# Patient Record
Sex: FEMALE | Race: WHITE
[De-identification: names, ages, dates, MRNs, and addresses within clinical notes are randomized per-mention and may not be internally consistent; named-entity substitution may affect disease eponyms.]

---

## 2019-04-02 ENCOUNTER — HOSPITAL ENCOUNTER (EMERGENCY)
Dept: HOSPITAL 26 - MED | Age: 29
Discharge: HOME | End: 2019-04-02
Payer: MEDICAID

## 2019-04-02 VITALS — DIASTOLIC BLOOD PRESSURE: 94 MMHG | SYSTOLIC BLOOD PRESSURE: 135 MMHG

## 2019-04-02 VITALS — DIASTOLIC BLOOD PRESSURE: 82 MMHG | SYSTOLIC BLOOD PRESSURE: 136 MMHG

## 2019-04-02 VITALS — WEIGHT: 197.25 LBS | BODY MASS INDEX: 36.3 KG/M2 | HEIGHT: 62 IN

## 2019-04-02 DIAGNOSIS — B34.9: Primary | ICD-10-CM

## 2019-04-02 NOTE — NUR
Patient discharged with v/s stable. Written and verbal after care instructions 
given and explained. 

Patient alert, oriented and verbalized understanding of instructions. 
Ambulatory with steady gait. All questions addressed prior to discharge. ID 
band removed. Patient advised to follow up with PMD. Rx of Tessalon Perles, 
Naprosyn given. Patient educated on indication of medication including possible 
reaction and side effects. Opportunity to ask questions provided and answered.

## 2019-04-02 NOTE — NUR
BIB SELF WTIH C/O COUGH WITH GREEN PHLEGM, WATERY EYES, RUNNY NOSE, BL EAR PAIN 
AND SORE THROAT 10/10 X 2 DAYS. PT HAS TAKEN ROBITUSSIN WITH NO RELIEF. 
WHEEZING NOTED TO UPPER LOBES INSPIRATORY. HOB UP. BED SIDE RAILS UP X1. ON LOW 
BED POSITION, LOCKED. MD ER MADE AWARE OF PT STATUS.